# Patient Record
Sex: FEMALE | Race: BLACK OR AFRICAN AMERICAN | NOT HISPANIC OR LATINO | Employment: FULL TIME | URBAN - METROPOLITAN AREA
[De-identification: names, ages, dates, MRNs, and addresses within clinical notes are randomized per-mention and may not be internally consistent; named-entity substitution may affect disease eponyms.]

---

## 2022-06-16 ENCOUNTER — OFFICE VISIT (OUTPATIENT)
Dept: DERMATOLOGY | Facility: CLINIC | Age: 42
End: 2022-06-16
Payer: COMMERCIAL

## 2022-06-16 ENCOUNTER — PATIENT MESSAGE (OUTPATIENT)
Dept: DERMATOLOGY | Facility: CLINIC | Age: 42
End: 2022-06-16

## 2022-06-16 DIAGNOSIS — L65.8 TRACTION ALOPECIA: ICD-10-CM

## 2022-06-16 DIAGNOSIS — L71.9 ROSACEA: Primary | ICD-10-CM

## 2022-06-16 DIAGNOSIS — L85.8 KERATOSIS PILARIS: ICD-10-CM

## 2022-06-16 DIAGNOSIS — L66.8 CENTRAL CENTRIFUGAL SCARRING ALOPECIA: ICD-10-CM

## 2022-06-16 PROCEDURE — 1159F PR MEDICATION LIST DOCUMENTED IN MEDICAL RECORD: ICD-10-PCS | Mod: CPTII,S$GLB,, | Performed by: DERMATOLOGY

## 2022-06-16 PROCEDURE — 1159F MED LIST DOCD IN RCRD: CPT | Mod: CPTII,S$GLB,, | Performed by: DERMATOLOGY

## 2022-06-16 PROCEDURE — 1160F RVW MEDS BY RX/DR IN RCRD: CPT | Mod: CPTII,S$GLB,, | Performed by: DERMATOLOGY

## 2022-06-16 PROCEDURE — 99204 PR OFFICE/OUTPT VISIT, NEW, LEVL IV, 45-59 MIN: ICD-10-PCS | Mod: S$GLB,,, | Performed by: DERMATOLOGY

## 2022-06-16 PROCEDURE — 99204 OFFICE O/P NEW MOD 45 MIN: CPT | Mod: S$GLB,,, | Performed by: DERMATOLOGY

## 2022-06-16 PROCEDURE — 1160F PR REVIEW ALL MEDS BY PRESCRIBER/CLIN PHARMACIST DOCUMENTED: ICD-10-PCS | Mod: CPTII,S$GLB,, | Performed by: DERMATOLOGY

## 2022-06-16 PROCEDURE — 99999 PR PBB SHADOW E&M-EST. PATIENT-LVL III: CPT | Mod: PBBFAC,,, | Performed by: DERMATOLOGY

## 2022-06-16 PROCEDURE — 99999 PR PBB SHADOW E&M-EST. PATIENT-LVL III: ICD-10-PCS | Mod: PBBFAC,,, | Performed by: DERMATOLOGY

## 2022-06-16 RX ORDER — MOMETASONE FUROATE 1 MG/ML
SOLUTION TOPICAL
Qty: 60 ML | Refills: 3 | Status: SHIPPED | OUTPATIENT
Start: 2022-06-16

## 2022-06-16 RX ORDER — AZELAIC ACID 0.15 G/G
AEROSOL, FOAM TOPICAL
Qty: 50 G | Refills: 5 | Status: SHIPPED | OUTPATIENT
Start: 2022-06-16 | End: 2023-09-05 | Stop reason: SDUPTHER

## 2022-06-16 RX ORDER — AMMONIUM LACTATE 12 G/100G
LOTION TOPICAL
Qty: 225 G | Refills: 11 | Status: SHIPPED | OUTPATIENT
Start: 2022-06-16 | End: 2023-09-05 | Stop reason: SDUPTHER

## 2022-06-16 NOTE — PROGRESS NOTES
Subjective:       Patient ID:  Sasha Rothman is a 42 y.o. female who presents for   Chief Complaint   Patient presents with    Hair Loss    Acne     History of Present Illness: The patient presents with chief complaint of acne, hair loss and kp.  Location: scalp(front), face and arms..  Duration: years  Signs/Symptoms: thinning    Prior treatments: none    She also c/o acne and keratosis pilaris.       Review of Systems   Constitutional: Negative for fever and chills.   Gastrointestinal: Negative for nausea and vomiting.   Skin: Positive for activity-related sunscreen use. Negative for daily sunscreen use and recent sunburn.   Hematologic/Lymphatic: Does not bruise/bleed easily.        Objective:    Physical Exam   Constitutional: She appears well-developed and well-nourished. No distress.   Neurological: She is alert and oriented to person, place, and time. She is not disoriented.   Psychiatric: She has a normal mood and affect.   Skin:   Areas Examined (abnormalities noted in diagram):   Scalp / Hair Palpated and Inspected  Head / Face Inspection Performed  Neck Inspection Performed  RUE Inspected  LUE Inspection Performed              Diagram Legend     Open and closed comedones      Inflammatory papules and pustules                         Assessment / Plan:        Rosacea  -     azelaic acid (FINACEA) 15 % Foam; AAA bid for redness/rosacea on face  Dispense: 50 g; Refill: 5  -     Mild of face, will start above med with sunscreen.     Traction alopecia  Central centrifugal scarring alopecia  -     mometasone (ELOCON) 0.1 % solution; AAA of scalp once daily.  Dispense: 60 mL; Refill: 3  -     Will start above med with hairstim metformin.  Consider hairstim minoxidil at f/u. Recommend OTC biotin.     Keratosis pilaris  -     ammonium lactate (LAC-HYDRIN) 12 % lotion; Apply to damp skin after bathing for keratosis pilaris  Dispense: 225 g; Refill: 11  -     Discussed dry skin care, AVS given.  Will start  ammonium lactate 12% lotion daily to damp skin.            Follow up in about 3 months (around 9/16/2022).

## 2022-11-09 ENCOUNTER — OFFICE VISIT (OUTPATIENT)
Dept: DERMATOLOGY | Facility: CLINIC | Age: 42
End: 2022-11-09
Payer: COMMERCIAL

## 2022-11-09 DIAGNOSIS — L66.8 CENTRAL CENTRIFUGAL SCARRING ALOPECIA: ICD-10-CM

## 2022-11-09 DIAGNOSIS — L65.8 TRACTION ALOPECIA: ICD-10-CM

## 2022-11-09 DIAGNOSIS — L71.9 ROSACEA: Primary | ICD-10-CM

## 2022-11-09 DIAGNOSIS — L85.8 KERATOSIS PILARIS: ICD-10-CM

## 2022-11-09 PROCEDURE — 99999 PR PBB SHADOW E&M-EST. PATIENT-LVL III: ICD-10-PCS | Mod: PBBFAC,,, | Performed by: DERMATOLOGY

## 2022-11-09 PROCEDURE — 1159F PR MEDICATION LIST DOCUMENTED IN MEDICAL RECORD: ICD-10-PCS | Mod: CPTII,S$GLB,, | Performed by: DERMATOLOGY

## 2022-11-09 PROCEDURE — 1160F PR REVIEW ALL MEDS BY PRESCRIBER/CLIN PHARMACIST DOCUMENTED: ICD-10-PCS | Mod: CPTII,S$GLB,, | Performed by: DERMATOLOGY

## 2022-11-09 PROCEDURE — 99214 OFFICE O/P EST MOD 30 MIN: CPT | Mod: 25,S$GLB,, | Performed by: DERMATOLOGY

## 2022-11-09 PROCEDURE — 1160F RVW MEDS BY RX/DR IN RCRD: CPT | Mod: CPTII,S$GLB,, | Performed by: DERMATOLOGY

## 2022-11-09 PROCEDURE — 99214 PR OFFICE/OUTPT VISIT, EST, LEVL IV, 30-39 MIN: ICD-10-PCS | Mod: 25,S$GLB,, | Performed by: DERMATOLOGY

## 2022-11-09 PROCEDURE — 1159F MED LIST DOCD IN RCRD: CPT | Mod: CPTII,S$GLB,, | Performed by: DERMATOLOGY

## 2022-11-09 PROCEDURE — 11900 PR INJECTION INTO SKIN LESIONS, UP TO 7: ICD-10-PCS | Mod: S$GLB,,, | Performed by: DERMATOLOGY

## 2022-11-09 PROCEDURE — 99999 PR PBB SHADOW E&M-EST. PATIENT-LVL III: CPT | Mod: PBBFAC,,, | Performed by: DERMATOLOGY

## 2022-11-09 PROCEDURE — 11900 INJECT SKIN LESIONS </W 7: CPT | Mod: S$GLB,,, | Performed by: DERMATOLOGY

## 2022-11-09 NOTE — PROGRESS NOTES
Subjective:       Patient ID:  Sasha Rothman is a 42 y.o. female who presents for   Chief Complaint   Patient presents with    Rosacea     Follow up; improvement ; pt states same week face started to feel better     Hx of traction alopecia and CCCA, rosacea and KP, last seen on 6/16/20.  She is currently using mometasone solution qD, hairstim metformin x 5 months, and nutrafol.  She denies significant improvement.      For rosacea, she is currently using finace afoam with improvement.     For KP, she uses amlactin lotion prn.           Review of Systems   Constitutional:  Negative for fever and chills.   Gastrointestinal:  Negative for nausea and vomiting.   Skin:  Positive for activity-related sunscreen use. Negative for daily sunscreen use and recent sunburn.   Hematologic/Lymphatic: Does not bruise/bleed easily.      Objective:    Physical Exam   Constitutional: She appears well-developed and well-nourished. No distress.   Neurological: She is alert and oriented to person, place, and time. She is not disoriented.   Psychiatric: She has a normal mood and affect.   Skin:   Areas Examined (abnormalities noted in diagram):   Scalp / Hair Palpated and Inspected  Head / Face Inspection Performed  Neck Inspection Performed  RUE Inspected  LUE Inspection Performed  Nails and Digits Inspection Performed                     Assessment / Plan:        Traction alopecia  Central centrifugal scarring alopecia  -     triamcinolone acetonide injection 10 mg  -     emollient combin. no.15, bulk, (PCCA LIPODERM BASE) Crea; Apply to scalp once daily  Dispense: 45 g; Refill: 5  -     triamcinolone acetonide injection 10 mg  -     continue mometasone solution solution, nutrafol, will change from hairstim metformin to Javi's topical metformin.  Will add hairstim minoxidil. The patient acknowledged understanding.   ILK #1 done today.  After risks, benefits and alternatives explained and side effect profile reviewed, including  hypopigmentation, telangiectasia and atrophy, the patient verbally consented to ILK injection. A total of 2 cc of kenalog 10 mg/ml was injected into the areas of hair loss for < 7 sites/lesions.  Pt tolerated well without side effects.  RTC in 4 weeks for repeat injection.    Rosacea  Continue finacea foam and sunscreen.    Keratosis pilaris  Continue amlactin.          No follow-ups on file.

## 2022-11-10 RX ORDER — EMOLLIENT COMBINATION NO. 15
CREAM (GRAM) MISCELLANEOUS
Qty: 45 G | Refills: 5 | Status: SHIPPED | OUTPATIENT
Start: 2022-11-10

## 2022-12-09 ENCOUNTER — OFFICE VISIT (OUTPATIENT)
Dept: DERMATOLOGY | Facility: CLINIC | Age: 42
End: 2022-12-09
Payer: COMMERCIAL

## 2022-12-09 DIAGNOSIS — L65.8 TRACTION ALOPECIA: Primary | ICD-10-CM

## 2022-12-09 DIAGNOSIS — L66.8 CENTRAL CENTRIFUGAL SCARRING ALOPECIA: ICD-10-CM

## 2022-12-09 PROCEDURE — 1159F MED LIST DOCD IN RCRD: CPT | Mod: CPTII,S$GLB,, | Performed by: DERMATOLOGY

## 2022-12-09 PROCEDURE — 1159F PR MEDICATION LIST DOCUMENTED IN MEDICAL RECORD: ICD-10-PCS | Mod: CPTII,S$GLB,, | Performed by: DERMATOLOGY

## 2022-12-09 PROCEDURE — 1160F RVW MEDS BY RX/DR IN RCRD: CPT | Mod: CPTII,S$GLB,, | Performed by: DERMATOLOGY

## 2022-12-09 PROCEDURE — 11900 PR INJECTION INTO SKIN LESIONS, UP TO 7: ICD-10-PCS | Mod: S$GLB,,, | Performed by: DERMATOLOGY

## 2022-12-09 PROCEDURE — 99999 PR PBB SHADOW E&M-EST. PATIENT-LVL III: CPT | Mod: PBBFAC,,, | Performed by: DERMATOLOGY

## 2022-12-09 PROCEDURE — 99999 PR PBB SHADOW E&M-EST. PATIENT-LVL III: ICD-10-PCS | Mod: PBBFAC,,, | Performed by: DERMATOLOGY

## 2022-12-09 PROCEDURE — 99499 UNLISTED E&M SERVICE: CPT | Mod: S$GLB,,, | Performed by: DERMATOLOGY

## 2022-12-09 PROCEDURE — 1160F PR REVIEW ALL MEDS BY PRESCRIBER/CLIN PHARMACIST DOCUMENTED: ICD-10-PCS | Mod: CPTII,S$GLB,, | Performed by: DERMATOLOGY

## 2022-12-09 PROCEDURE — 99499 NO LOS: ICD-10-PCS | Mod: S$GLB,,, | Performed by: DERMATOLOGY

## 2022-12-09 PROCEDURE — 11900 INJECT SKIN LESIONS </W 7: CPT | Mod: S$GLB,,, | Performed by: DERMATOLOGY

## 2022-12-09 NOTE — PROGRESS NOTES
Subjective:       Patient ID:  Sasha Rothman is a 42 y.o. female who presents for   Chief Complaint   Patient presents with    Hair Loss     Injections      Hx of traction alopecia and CCCA, rosacea (on finacea foam) and KP, last seen on 11/19/22.  She is currently using mometasone solution qD, compounded metformin, hairstim minoxidil, and nutrafol.  Uncertain if significant improvement.              Review of Systems   Constitutional:  Negative for fever and chills.   Gastrointestinal:  Negative for nausea and vomiting.   Skin:  Positive for activity-related sunscreen use. Negative for daily sunscreen use and recent sunburn.   Hematologic/Lymphatic: Does not bruise/bleed easily.      Objective:    Physical Exam   Constitutional: She appears well-developed and well-nourished. No distress.   Neurological: She is alert and oriented to person, place, and time. She is not disoriented.   Psychiatric: She has a normal mood and affect.   Skin:   Areas Examined (abnormalities noted in diagram):   Scalp / Hair Palpated and Inspected  Head / Face Inspection Performed  Neck Inspection Performed  RUE Inspected  LUE Inspection Performed  Nails and Digits Inspection Performed                Assessment / Plan:        Traction alopecia  Central centrifugal scarring alopecia  -     triamcinolone acetonide injection 10 mg  -     continue mometasone solution solution, nutrafol, hairstim minoxidil, compound metformin.  ILK #2 done today.  After risks, benefits and alternatives explained and side effect profile reviewed, including hypopigmentation, telangiectasia and atrophy, the patient verbally consented to ILK injection. A total of 2 cc of kenalog 10 mg/ml was injected into the areas of hair loss for < 7 sites/lesions.  Pt tolerated well without side effects.  RTC in 4 weeks for repeat injection.        Follow up in about 4 weeks (around 1/6/2023).

## 2023-01-06 ENCOUNTER — OFFICE VISIT (OUTPATIENT)
Dept: DERMATOLOGY | Facility: CLINIC | Age: 43
End: 2023-01-06
Payer: COMMERCIAL

## 2023-01-06 DIAGNOSIS — L65.8 TRACTION ALOPECIA: Primary | ICD-10-CM

## 2023-01-06 DIAGNOSIS — L66.8 CENTRAL CENTRIFUGAL SCARRING ALOPECIA: ICD-10-CM

## 2023-01-06 PROCEDURE — 1159F MED LIST DOCD IN RCRD: CPT | Mod: CPTII,S$GLB,, | Performed by: DERMATOLOGY

## 2023-01-06 PROCEDURE — 99999 PR PBB SHADOW E&M-EST. PATIENT-LVL III: CPT | Mod: PBBFAC,,, | Performed by: DERMATOLOGY

## 2023-01-06 PROCEDURE — 99499 NO LOS: ICD-10-PCS | Mod: S$GLB,,, | Performed by: DERMATOLOGY

## 2023-01-06 PROCEDURE — 96372 THER/PROPH/DIAG INJ SC/IM: CPT | Mod: S$GLB,,, | Performed by: DERMATOLOGY

## 2023-01-06 PROCEDURE — 1160F PR REVIEW ALL MEDS BY PRESCRIBER/CLIN PHARMACIST DOCUMENTED: ICD-10-PCS | Mod: CPTII,S$GLB,, | Performed by: DERMATOLOGY

## 2023-01-06 PROCEDURE — 96372 PR INJECTION,THERAP/PROPH/DIAG2ST, IM OR SUBCUT: ICD-10-PCS | Mod: S$GLB,,, | Performed by: DERMATOLOGY

## 2023-01-06 PROCEDURE — 99499 UNLISTED E&M SERVICE: CPT | Mod: S$GLB,,, | Performed by: DERMATOLOGY

## 2023-01-06 PROCEDURE — 1159F PR MEDICATION LIST DOCUMENTED IN MEDICAL RECORD: ICD-10-PCS | Mod: CPTII,S$GLB,, | Performed by: DERMATOLOGY

## 2023-01-06 PROCEDURE — 1160F RVW MEDS BY RX/DR IN RCRD: CPT | Mod: CPTII,S$GLB,, | Performed by: DERMATOLOGY

## 2023-01-06 PROCEDURE — 99999 PR PBB SHADOW E&M-EST. PATIENT-LVL III: ICD-10-PCS | Mod: PBBFAC,,, | Performed by: DERMATOLOGY

## 2023-01-06 NOTE — PROGRESS NOTES
Subjective:       Patient ID:  Sasha Rothman is a 42 y.o. female who presents for   Chief Complaint   Patient presents with    Injections     Hx of traction alopecia and CCCA, rosacea (on finacea foam) and KP, last seen on 12/9/22.  She is currently using mometasone solution qD, compounded metformin, hairstim minoxidil, and nutrafol.  + mild improvement at hairline. Denies improvement vertex scalp.            Review of Systems   Constitutional:  Negative for fever and chills.   Gastrointestinal:  Negative for nausea and vomiting.   Skin:  Positive for activity-related sunscreen use. Negative for daily sunscreen use and recent sunburn.   Hematologic/Lymphatic: Does not bruise/bleed easily.      Objective:    Physical Exam   Constitutional: She appears well-developed and well-nourished. No distress.   Neurological: She is alert and oriented to person, place, and time. She is not disoriented.   Psychiatric: She has a normal mood and affect.   Skin:   Areas Examined (abnormalities noted in diagram):   Scalp / Hair Palpated and Inspected  Head / Face Inspection Performed  Neck Inspection Performed  RUE Inspected  LUE Inspection Performed  Nails and Digits Inspection Performed              Assessment / Plan:        Traction alopecia  Central centrifugal scarring alopecia  -     triamcinolone acetonide injection 10 mg  -     Continue mometasone solution solution, nutrafol, hairstim minoxidil, compound metformin.  ILK #3 done today.  After risks, benefits and alternatives explained and side effect profile reviewed, including hypopigmentation, telangiectasia and atrophy, the patient verbally consented to ILK injection. A total of 2 cc of kenalog 10 mg/ml was injected into the areas of hair loss for < 7 sites/lesions.  Pt tolerated well without side effects.  RTC in 4 weeks for repeat injection.           Follow up in about 8 weeks (around 3/3/2023).

## 2023-03-08 ENCOUNTER — OFFICE VISIT (OUTPATIENT)
Dept: DERMATOLOGY | Facility: CLINIC | Age: 43
End: 2023-03-08
Payer: COMMERCIAL

## 2023-03-08 DIAGNOSIS — L65.8 TRACTION ALOPECIA: Primary | ICD-10-CM

## 2023-03-08 DIAGNOSIS — L66.8 CENTRAL CENTRIFUGAL SCARRING ALOPECIA: ICD-10-CM

## 2023-03-08 PROCEDURE — 1160F PR REVIEW ALL MEDS BY PRESCRIBER/CLIN PHARMACIST DOCUMENTED: ICD-10-PCS | Mod: CPTII,S$GLB,, | Performed by: DERMATOLOGY

## 2023-03-08 PROCEDURE — 99999 PR PBB SHADOW E&M-EST. PATIENT-LVL III: ICD-10-PCS | Mod: PBBFAC,,, | Performed by: DERMATOLOGY

## 2023-03-08 PROCEDURE — 1159F PR MEDICATION LIST DOCUMENTED IN MEDICAL RECORD: ICD-10-PCS | Mod: CPTII,S$GLB,, | Performed by: DERMATOLOGY

## 2023-03-08 PROCEDURE — 1160F RVW MEDS BY RX/DR IN RCRD: CPT | Mod: CPTII,S$GLB,, | Performed by: DERMATOLOGY

## 2023-03-08 PROCEDURE — 99212 OFFICE O/P EST SF 10 MIN: CPT | Mod: S$GLB,,, | Performed by: DERMATOLOGY

## 2023-03-08 PROCEDURE — 99999 PR PBB SHADOW E&M-EST. PATIENT-LVL III: CPT | Mod: PBBFAC,,, | Performed by: DERMATOLOGY

## 2023-03-08 PROCEDURE — 99212 PR OFFICE/OUTPT VISIT, EST, LEVL II, 10-19 MIN: ICD-10-PCS | Mod: S$GLB,,, | Performed by: DERMATOLOGY

## 2023-03-08 PROCEDURE — 1159F MED LIST DOCD IN RCRD: CPT | Mod: CPTII,S$GLB,, | Performed by: DERMATOLOGY

## 2023-03-08 NOTE — PROGRESS NOTES
Subjective:       Patient ID:  Sasha Rothman is a 43 y.o. female who presents for   Chief Complaint   Patient presents with    Injections     Follow up for series of hair loss injections     Hx of traction alopecia and CCCA, rosacea (on finacea foam) and KP, last seen on 1/6/23.  She is currently using compounded metformin, hairstim minoxidil, and nutrafol.  + mild improvement at hairline. Denies improvement vertex scalp.    Prior tx: mometasone solution, compounded metformin, hairstim minoxidil, nutrafol, ILK            Review of Systems   Constitutional:  Negative for fever and chills.   Gastrointestinal:  Negative for nausea and vomiting.   Skin:  Positive for activity-related sunscreen use. Negative for daily sunscreen use and recent sunburn.   Hematologic/Lymphatic: Does not bruise/bleed easily.      Objective:    Physical Exam   Constitutional: She appears well-developed and well-nourished. No distress.   Neurological: She is alert and oriented to person, place, and time. She is not disoriented.   Psychiatric: She has a normal mood and affect.   Skin:   Areas Examined (abnormalities noted in diagram):   Scalp / Hair Palpated and Inspected  Head / Face Inspection Performed  Neck Inspection Performed  RUE Inspected  LUE Inspection Performed  Nails and Digits Inspection Performed              Assessment / Plan:        Traction alopecia  Central centrifugal scarring alopecia  -     triamcinolone acetonide injection 10 mg  -     + improvement in frontal hairline, however recalcitrant areas of the vertex and temporal scalp.  Pt defers ILK today.  Discussed pt should continue topical therapy (hairstim minoxidil and compound metformin) and consider consultation with Dr. Kanwal Dumont for consideration of PRP or other therapies. Also recommend continue nutrafol.            Follow up if symptoms worsen or fail to improve.

## 2023-03-17 ENCOUNTER — TELEPHONE (OUTPATIENT)
Dept: DERMATOLOGY | Facility: CLINIC | Age: 43
End: 2023-03-17
Payer: COMMERCIAL

## 2023-03-17 NOTE — TELEPHONE ENCOUNTER
Returned call to patient.  Informed pt Dr. Garcia is not in office today, msg will be sent over to Dr. Garcia     ----- Message from [employer] [place] sent at 3/17/2023 12:21 PM CDT -----  Contact: Sasha  Patient is calling to speak with nurse regarding concerns. Reports since last appt even with using topical cream, skin on scalp is itching an peeling off in chunks. Please give patient a call back at [phone]

## 2023-03-24 ENCOUNTER — TELEPHONE (OUTPATIENT)
Dept: DERMATOLOGY | Facility: CLINIC | Age: 43
End: 2023-03-24
Payer: COMMERCIAL

## 2023-03-24 NOTE — TELEPHONE ENCOUNTER
Retuned call to patient to let pt know Per Dr. Garcia, I Recommend Sasha reach out to [facility] to see if they switched her formulation to one with tretinoin.  otherwise, she can stop everything on the scalp and restart mometasone solution for a few days to see if improves. No answer. LVM.        ----- Message from Audrey Garcia MD sent at 3/23/2023  5:59 PM CDT -----  Regarding: RE: Please Advise  Contact: Sasha  Recommend Sasha reach out to [facility] to see if they switched her formulation to one with tretinoin.  Othwrise, she can stop everything on the scalp and restart mometasone solution for a few days to see if improves.  ----- Message -----  From: Migdalia Cherry MA  Sent: 3/17/2023   1:59 PM CDT  To: Audrey Garcia MD  Subject: Please Advise                                    Spoke with patient, patient stated scalp has been peeling with big flakes and itching since using the topicals that were prescribed.  Patient is asking should she continue the medication or should she come in for a visit?  ----- Message -----  From: Natali Edwards  Sent: 3/17/2023  12:22 PM CDT  To: Jose Ventura Staff    Patient is calling to speak with nurse regarding concerns. Reports since last appt even with using topical cream, skin on scalp is itching an peeling off in chunks. Please give patient a call back at [phone]

## 2023-03-28 ENCOUNTER — PATIENT MESSAGE (OUTPATIENT)
Dept: RESEARCH | Facility: HOSPITAL | Age: 43
End: 2023-03-28
Payer: COMMERCIAL

## 2023-04-04 ENCOUNTER — TELEPHONE (OUTPATIENT)
Dept: DERMATOLOGY | Facility: CLINIC | Age: 43
End: 2023-04-04
Payer: COMMERCIAL

## 2023-04-04 NOTE — TELEPHONE ENCOUNTER
Returned call. Relayed message given to Migdalia by Dr. Garcia. Pt verbalized understanding and will keep us updated on her scalp.   ----- Message from Rosario Cleveland sent at 4/4/2023  4:11 PM CDT -----  Contact: Sasha  Type:  Patient Returning Call    Who Called:Sasha  Who Left Message for Patient: Migdalia   Does the patient know what this is regarding?:  not sure   Would the patient rather a call back or a response via [facility] [employer] [facility]? Call back  Best Call Back Number:[phone]  Additional Information:   Thanks   Am

## 2023-09-05 DIAGNOSIS — L71.9 ROSACEA: ICD-10-CM

## 2023-09-05 DIAGNOSIS — L85.8 KERATOSIS PILARIS: ICD-10-CM

## 2023-09-05 RX ORDER — AZELAIC ACID 0.15 G/G
AEROSOL, FOAM TOPICAL
Qty: 50 G | Refills: 5 | Status: SHIPPED | OUTPATIENT
Start: 2023-09-05

## 2023-09-05 RX ORDER — AMMONIUM LACTATE 12 G/100G
LOTION TOPICAL
Qty: 225 G | Refills: 11 | Status: SHIPPED | OUTPATIENT
Start: 2023-09-05

## 2024-05-13 ENCOUNTER — OFFICE VISIT (OUTPATIENT)
Dept: DERMATOLOGY | Facility: CLINIC | Age: 44
End: 2024-05-13
Payer: COMMERCIAL

## 2024-05-13 DIAGNOSIS — B00.1 HERPES LABIALIS: ICD-10-CM

## 2024-05-13 DIAGNOSIS — L71.9 ROSACEA: ICD-10-CM

## 2024-05-13 DIAGNOSIS — L30.9 DERMATITIS OF LIP: Primary | ICD-10-CM

## 2024-05-13 PROCEDURE — 99214 OFFICE O/P EST MOD 30 MIN: CPT | Mod: S$GLB,,, | Performed by: DERMATOLOGY

## 2024-05-13 PROCEDURE — 3044F HG A1C LEVEL LT 7.0%: CPT | Mod: CPTII,S$GLB,, | Performed by: DERMATOLOGY

## 2024-05-13 PROCEDURE — 1159F MED LIST DOCD IN RCRD: CPT | Mod: CPTII,S$GLB,, | Performed by: DERMATOLOGY

## 2024-05-13 PROCEDURE — 1160F RVW MEDS BY RX/DR IN RCRD: CPT | Mod: CPTII,S$GLB,, | Performed by: DERMATOLOGY

## 2024-05-13 PROCEDURE — 99999 PR PBB SHADOW E&M-EST. PATIENT-LVL III: CPT | Mod: PBBFAC,,, | Performed by: DERMATOLOGY

## 2024-05-13 RX ORDER — TRIAMCINOLONE ACETONIDE 0.25 MG/G
OINTMENT TOPICAL
Qty: 30 G | Refills: 1 | Status: SHIPPED | OUTPATIENT
Start: 2024-05-13

## 2024-05-13 RX ORDER — METRONIDAZOLE 7.5 MG/G
CREAM TOPICAL 2 TIMES DAILY
Qty: 45 G | Refills: 5 | Status: SHIPPED | OUTPATIENT
Start: 2024-05-13 | End: 2025-05-13

## 2024-05-13 NOTE — PROGRESS NOTES
Subjective:      Patient ID:  Sasha Rothman is a 44 y.o. female who presents for   Chief Complaint   Patient presents with    Dry Skin     Located on the face and the lips/ pt states it feels like a barrier on her lips. Pt states a previous cold sore last week. X 1 week s/s dryness, Tx lip oils/ non effective ,OTC creams  / non effective      Hx of traction alopecia and CCCA, rosacea (on finacea foam) and KP, last seen on 3/8/23. Here today for new issue:    History of Present Illness: The patient presents with chief complaint of lip dryness. Possible cold sore.   Location: lips  Duration: several weeks  Signs/Symptoms: dryness, changes in texture    Prior treatments: aquaphor lip protection and vaseline advanced healing    Using new facial pads with glycolic or sal acid.               Review of Systems   Constitutional:  Negative for fever and chills.   Gastrointestinal:  Negative for nausea and vomiting.   Skin:  Positive for activity-related sunscreen use and dry lips. Negative for daily sunscreen use and recent sunburn.   Hematologic/Lymphatic: Does not bruise/bleed easily.       Objective:   Physical Exam   Constitutional: She appears well-developed and well-nourished. No distress.   Neurological: She is alert and oriented to person, place, and time. She is not disoriented.   Psychiatric: She has a normal mood and affect.   Skin:   Areas Examined (abnormalities noted in diagram):   Head / Face Inspection Performed  Neck Inspection Performed  RUE Inspected  LUE Inspection Performed  Nails and Digits Inspection Performed             Assessment / Plan:        Dermatitis of lip  -     triamcinolone acetonide 0.025% (KENALOG) 0.025 % Oint; AAA bid prn. Use for 1 week then taper. Mild steroid. For irritation of the lips.  Dispense: 30 g; Refill: 1  -     recommend d/c products with glycolic acid/sal acid/benzoyl peroxide. Will start above med. Will give samples of gentle cleanser/moisturizer. The patient  acknowledged understanding.     Rosacea  -     metronidazole 0.75% (METROCREAM) 0.75 % Crea; Apply topically 2 (two) times daily.  Dispense: 45 g; Refill: 5  -     with erythema, will d/c finacea foam and start trial of above med instead. The patient acknowledged understanding.     Herpes labialis  Currently resolved, discussed pt can message if recurs and will prescribe valtrex. The patient acknowledged understanding.              Follow up in about 3 months (around 8/13/2024).

## 2024-05-24 ENCOUNTER — PATIENT MESSAGE (OUTPATIENT)
Dept: DERMATOLOGY | Facility: CLINIC | Age: 44
End: 2024-05-24
Payer: COMMERCIAL

## 2024-09-30 ENCOUNTER — TELEPHONE (OUTPATIENT)
Dept: DERMATOLOGY | Facility: CLINIC | Age: 44
End: 2024-09-30
Payer: COMMERCIAL

## 2024-09-30 NOTE — TELEPHONE ENCOUNTER
Called pt regarding scheduling . Pt successfully scheduled for 10/3 with Anna Coates PA-C. Pt verbalized understanding.     ----- Message from Jeanette Munoz sent at 9/30/2024 11:17 AM CDT -----  Regarding: appt access  PATIENT CALL    Pt called to schedule EP appt. CC: rash above the breasts, spreading up to her shoulders. No appts in EPIC, pt open to seeing an SCAR if that's the only thing available. Please call back at [phone] to assist further

## 2024-10-03 ENCOUNTER — OFFICE VISIT (OUTPATIENT)
Dept: DERMATOLOGY | Facility: CLINIC | Age: 44
End: 2024-10-03
Payer: COMMERCIAL

## 2024-10-03 DIAGNOSIS — L30.9 DERMATITIS: Primary | ICD-10-CM

## 2024-10-03 DIAGNOSIS — L30.9 DERMATITIS OF LIP: ICD-10-CM

## 2024-10-03 PROCEDURE — 99999 PR PBB SHADOW E&M-EST. PATIENT-LVL III: CPT | Mod: PBBFAC,,, | Performed by: PHYSICIAN ASSISTANT

## 2024-10-03 PROCEDURE — 1160F RVW MEDS BY RX/DR IN RCRD: CPT | Mod: CPTII,S$GLB,, | Performed by: PHYSICIAN ASSISTANT

## 2024-10-03 PROCEDURE — 1159F MED LIST DOCD IN RCRD: CPT | Mod: CPTII,S$GLB,, | Performed by: PHYSICIAN ASSISTANT

## 2024-10-03 PROCEDURE — 3044F HG A1C LEVEL LT 7.0%: CPT | Mod: CPTII,S$GLB,, | Performed by: PHYSICIAN ASSISTANT

## 2024-10-03 PROCEDURE — 99214 OFFICE O/P EST MOD 30 MIN: CPT | Mod: S$GLB,,, | Performed by: PHYSICIAN ASSISTANT

## 2024-10-03 RX ORDER — TRIAMCINOLONE ACETONIDE 0.25 MG/G
CREAM TOPICAL 2 TIMES DAILY
Qty: 80 G | Refills: 0 | Status: SHIPPED | OUTPATIENT
Start: 2024-10-03

## 2024-10-03 NOTE — PROGRESS NOTES
Subjective:      Patient ID:  Sasha Rothman is a 44 y.o. female who presents for   Chief Complaint   Patient presents with    Rash     Located on the neck and bilateral shoulder X 1 week s/s fine bumps, spreading, minimal itching/sensitive. Tx benadryl,, metrocream/ non effective       Hx of traction alopecia and CCCA, rosacea (on finacea foam) and KP, last seen on 3/8/23. Here today for new issue: rash of chest, started about a week ago. Feels like fine bumps which are more prominent at times. +Occasional itching. No stinging, burning, or blistering.  Denies aggravating factors.  Believes it is slowly starting to spread out to shoulders and neck.  She is unsure if this is related to her rosacea or if something different.   Only uses Cetaphil Gentle cleanser and Cetaphil moisturizer.     Denies new meds, soaps, detergents.                Review of Systems   Constitutional:  Negative for fever and chills.   Gastrointestinal:  Negative for nausea and vomiting.   Skin:  Positive for itching, rash and activity-related sunscreen use. Negative for dry skin, sun sensitivity, daily sunscreen use, recent sunburn and dry lips.   Hematologic/Lymphatic: Does not bruise/bleed easily.       Objective:   Physical Exam   Constitutional: She appears well-developed and well-nourished. No distress.   Neurological: She is alert and oriented to person, place, and time. She is not disoriented.   Psychiatric: She has a normal mood and affect.   Skin:   Areas Examined (abnormalities noted in diagram):   Head / Face Inspection Performed  Neck Inspection Performed  Chest / Axilla Inspection Performed  Abdomen Inspection Performed  Back Inspection Performed  RUE Inspected  LUE Inspection Performed  RLE Inspected  LLE Inspection Performed            Diagram Legend     Erythematous scaling macule/papule c/w actinic keratosis       Vascular papule c/w angioma      Pigmented verrucoid papule/plaque c/w seborrheic keratosis      Yellow  umbilicated papule c/w sebaceous hyperplasia      Irregularly shaped tan macule c/w lentigo     1-2 mm smooth white papules consistent with Milia      Movable subcutaneous cyst with punctum c/w epidermal inclusion cyst      Subcutaneous movable cyst c/w pilar cyst      Firm pink to brown papule c/w dermatofibroma      Pedunculated fleshy papule(s) c/w skin tag(s)      Evenly pigmented macule c/w junctional nevus     Mildly variegated pigmented, slightly irregular-bordered macule c/w mildly atypical nevus      Flesh colored to evenly pigmented papule c/w intradermal nevus       Pink pearly papule/plaque c/w basal cell carcinoma      Erythematous hyperkeratotic cursted plaque c/w SCC      Surgical scar with no sign of skin cancer recurrence      Open and closed comedones      Inflammatory papules and pustules      Verrucoid papule consistent consistent with wart     Erythematous eczematous patches and plaques     Dystrophic onycholytic nail with subungual debris c/w onychomycosis     Umbilicated papule    Erythematous-base heme-crusted tan verrucoid plaque consistent with inflamed seborrheic keratosis     Erythematous Silvery Scaling Plaque c/w Psoriasis     See annotation      Assessment / Plan:        Dermatitis  -     triamcinolone acetonide 0.025% (KENALOG) 0.025 % cream; Apply topically 2 (two) times daily. PRN rash of chest.  Dispense: 80 g; Refill: 0  Ddx: KP vs. Other eczematous vs. ICD vs. Much less likely rosacea vs. Photosensitivity  Agree to trial of above, liberal emollients, strict sensitive skin care. D/o olay wash. Would reconsider biopsy if not improved.           Follow up in about 2 months (around 12/3/2024).

## 2024-10-03 NOTE — PATIENT INSTRUCTIONS
New Skin Care Regimen  Soap: Dove sensitive skin bar or liquid  Moisturizer: ceraVe or cetaphil cream or La Roche Posay Triple Repair Cream  Detergent: Tide Free, All Free, or Cheer Free   Fabric softener: do not use  Colognes/Perfumes/Fragrances: do not use  Bathing: shower or bathe with lukewarm water < 10 minutes

## 2024-10-25 DIAGNOSIS — L85.8 KERATOSIS PILARIS: ICD-10-CM

## 2024-10-29 RX ORDER — AMMONIUM LACTATE 12 G/100G
LOTION TOPICAL
Qty: 225 G | Refills: 11 | Status: SHIPPED | OUTPATIENT
Start: 2024-10-29

## 2024-11-14 ENCOUNTER — PATIENT MESSAGE (OUTPATIENT)
Dept: DERMATOLOGY | Facility: CLINIC | Age: 44
End: 2024-11-14
Payer: COMMERCIAL

## 2024-11-21 RX ORDER — TRIAMCINOLONE ACETONIDE 1 MG/G
CREAM TOPICAL 2 TIMES DAILY
Qty: 80 G | Refills: 0 | Status: SHIPPED | OUTPATIENT
Start: 2024-11-21

## 2024-12-06 ENCOUNTER — TELEPHONE (OUTPATIENT)
Dept: DERMATOLOGY | Facility: CLINIC | Age: 44
End: 2024-12-06
Payer: COMMERCIAL

## 2024-12-09 ENCOUNTER — TELEPHONE (OUTPATIENT)
Dept: DERMATOLOGY | Facility: CLINIC | Age: 44
End: 2024-12-09
Payer: COMMERCIAL

## 2024-12-09 NOTE — TELEPHONE ENCOUNTER
Returned call. Pt appt on 12/24 canceled and r/s to next Monday for a virtual at 1115  ----- Message from Emilee sent at 12/9/2024 10:53 AM CST -----  Contact: [phone]  Type:  Patient Returning Call    Who Called:Sasha   Who Left Message for Patient:nurse   Does the patient know what this is regarding?:yes   Would the patient rather a call back or a response via [facility]? Call Back   Best Call Back Number:[phone]   Additional Information: n/a      Thanks KB

## 2024-12-16 ENCOUNTER — TELEPHONE (OUTPATIENT)
Dept: DERMATOLOGY | Facility: CLINIC | Age: 44
End: 2024-12-16
Payer: COMMERCIAL

## 2024-12-18 ENCOUNTER — TELEPHONE (OUTPATIENT)
Dept: DERMATOLOGY | Facility: CLINIC | Age: 44
End: 2024-12-18

## 2024-12-18 ENCOUNTER — OFFICE VISIT (OUTPATIENT)
Dept: DERMATOLOGY | Facility: CLINIC | Age: 44
End: 2024-12-18
Payer: COMMERCIAL

## 2024-12-18 DIAGNOSIS — L30.9 DERMATITIS: Primary | ICD-10-CM

## 2024-12-18 PROCEDURE — 99214 OFFICE O/P EST MOD 30 MIN: CPT | Mod: 95,,, | Performed by: STUDENT IN AN ORGANIZED HEALTH CARE EDUCATION/TRAINING PROGRAM

## 2024-12-18 PROCEDURE — 1159F MED LIST DOCD IN RCRD: CPT | Mod: CPTII,95,, | Performed by: STUDENT IN AN ORGANIZED HEALTH CARE EDUCATION/TRAINING PROGRAM

## 2024-12-18 PROCEDURE — 3044F HG A1C LEVEL LT 7.0%: CPT | Mod: CPTII,95,, | Performed by: STUDENT IN AN ORGANIZED HEALTH CARE EDUCATION/TRAINING PROGRAM

## 2024-12-18 PROCEDURE — 1160F RVW MEDS BY RX/DR IN RCRD: CPT | Mod: CPTII,95,, | Performed by: STUDENT IN AN ORGANIZED HEALTH CARE EDUCATION/TRAINING PROGRAM

## 2024-12-18 PROCEDURE — G2211 COMPLEX E/M VISIT ADD ON: HCPCS | Mod: 95,,, | Performed by: STUDENT IN AN ORGANIZED HEALTH CARE EDUCATION/TRAINING PROGRAM

## 2024-12-18 RX ORDER — MOMETASONE FUROATE 1 MG/G
OINTMENT TOPICAL DAILY
Qty: 45 G | Refills: 2 | Status: SHIPPED | OUTPATIENT
Start: 2024-12-18

## 2024-12-18 NOTE — TELEPHONE ENCOUNTER
Outreach made to patient to schedule follow up appointment. Spoke to patient appointment scheduled for  2/13/2025 at 1 pm.      ----- Message from Patrice Jones MD sent at 12/18/2024  4:25 PM CST -----  Can we please schedule patient for follow-up appointment in office for evaluation for her rash? Thanks.

## 2024-12-18 NOTE — PROGRESS NOTES
The patient location is: [place]  The chief complaint leading to consultation is: follow-up eczema/rash    Visit type: audiovisual    Face to Face time with patient: 10mins  10 minutes of total time spent on the encounter, which includes face to face time and non-face to face time preparing to see the patient (eg, review of tests), Obtaining and/or reviewing separately obtained history, Documenting clinical information in the electronic or other health record, Independently interpreting results (not separately reported) and communicating results to the patient/family/caregiver, or Care coordination (not separately reported).         Each patient to whom he or she provides medical services by telemedicine is:  (1) informed of the relationship between the physician and patient and the respective role of any other health care provider with respect to management of the patient; and (2) notified that he or she may decline to receive medical services by telemedicine and may withdraw from such care at any time.    Notes: Presents for follow-up of a rash on the arms and upper chest area, recently seen by STEPHANIE Coates for a bumpy rash on the skin that is very itchy. Was started initially on TAC 0.025% cream, then increased to TAC 0.1%, which did help, but still with a lot of itching and bumpy skin.     ROS: Otherwise negative    PE: const/neuro - AAOx3, NAD          Skin -               A/P: 1) Dermatitis - suspect follicular eczema, as the TAC helped somewhat. Will increase the strength of the topical steroid to mometasone. If no improvement, consider in office visit for biopsy.

## 2025-02-13 ENCOUNTER — OFFICE VISIT (OUTPATIENT)
Dept: DERMATOLOGY | Facility: CLINIC | Age: 45
End: 2025-02-13
Payer: COMMERCIAL

## 2025-02-13 DIAGNOSIS — L73.9 FOLLICULITIS: ICD-10-CM

## 2025-02-13 DIAGNOSIS — L30.9 DERMATITIS: Primary | ICD-10-CM

## 2025-02-13 PROCEDURE — 99999 PR PBB SHADOW E&M-EST. PATIENT-LVL III: CPT | Mod: PBBFAC,,, | Performed by: STUDENT IN AN ORGANIZED HEALTH CARE EDUCATION/TRAINING PROGRAM

## 2025-02-13 PROCEDURE — 99213 OFFICE O/P EST LOW 20 MIN: CPT | Mod: S$GLB,,, | Performed by: STUDENT IN AN ORGANIZED HEALTH CARE EDUCATION/TRAINING PROGRAM

## 2025-02-13 PROCEDURE — G2211 COMPLEX E/M VISIT ADD ON: HCPCS | Mod: S$GLB,,, | Performed by: STUDENT IN AN ORGANIZED HEALTH CARE EDUCATION/TRAINING PROGRAM

## 2025-02-13 PROCEDURE — 1160F RVW MEDS BY RX/DR IN RCRD: CPT | Mod: CPTII,S$GLB,, | Performed by: STUDENT IN AN ORGANIZED HEALTH CARE EDUCATION/TRAINING PROGRAM

## 2025-02-13 PROCEDURE — 1159F MED LIST DOCD IN RCRD: CPT | Mod: CPTII,S$GLB,, | Performed by: STUDENT IN AN ORGANIZED HEALTH CARE EDUCATION/TRAINING PROGRAM

## 2025-02-13 RX ORDER — CLINDAMYCIN PHOSPHATE 10 MG/ML
SOLUTION TOPICAL DAILY
Qty: 60 EACH | Refills: 2 | Status: SHIPPED | OUTPATIENT
Start: 2025-02-13 | End: 2026-02-13

## 2025-02-13 NOTE — PROGRESS NOTES
Subjective:       Patient ID:  Sasha Rothman is a 45 y.o. female who presents for   Chief Complaint   Patient presents with    Dermatitis     Follow up     History of Present Illness: The patient presents for follow up of a rash on the arms, last seen on 12/18/24 via virtual visit for a rash on the arms. She was started on mometasone and this has significantly helped with symptoms. No further itching or irritation.  She also has a history of rosacea, currently has some red bumps and pus bumps on the chest that have been bothersome; no treatments.         Review of Systems   Constitutional:  Negative for fever and chills.   Skin:  Positive for rash. Negative for itching and dry skin.        Objective:    Physical Exam   Constitutional: She appears well-developed and well-nourished. No distress.   Neurological: She is alert and oriented to person, place, and time. She is not disoriented.   Psychiatric: She has a normal mood and affect.   Skin:   Areas Examined (abnormalities noted in diagram):   Head / Face Inspection Performed  Neck Inspection Performed  Chest / Axilla Inspection Performed  RUE Inspected  LUE Inspection Performed              Diagram Legend     Erythematous scaling macule/papule c/w actinic keratosis       Vascular papule c/w angioma      Pigmented verrucoid papule/plaque c/w seborrheic keratosis      Yellow umbilicated papule c/w sebaceous hyperplasia      Irregularly shaped tan macule c/w lentigo     1-2 mm smooth white papules consistent with Milia      Movable subcutaneous cyst with punctum c/w epidermal inclusion cyst      Subcutaneous movable cyst c/w pilar cyst      Firm pink to brown papule c/w dermatofibroma      Pedunculated fleshy papule(s) c/w skin tag(s)      Evenly pigmented macule c/w junctional nevus     Mildly variegated pigmented, slightly irregular-bordered macule c/w mildly atypical nevus      Flesh colored to evenly pigmented papule c/w intradermal nevus       Pink pearly  papule/plaque c/w basal cell carcinoma      Erythematous hyperkeratotic cursted plaque c/w SCC      Surgical scar with no sign of skin cancer recurrence      Open and closed comedones      Inflammatory papules and pustules      Verrucoid papule consistent consistent with wart     Erythematous eczematous patches and plaques     Dystrophic onycholytic nail with subungual debris c/w onychomycosis     Umbilicated papule    Erythematous-base heme-crusted tan verrucoid plaque consistent with inflamed seborrheic keratosis     Erythematous Silvery Scaling Plaque c/w Psoriasis     See annotation      Assessment / Plan:        Dermatitis - on the arms have resolved with mometasone. Continue to use as needed. Counseled patient on gentle skin care regimen, including need for sensitive soaps/detergents, as well as need for frequent use of sensitize moisturizers.       Folliculitis/rosacea - on the chest.   -     clindamycin (CLEOCIN T) 1 % Swab; Apply topically once daily. Apply for rosacea  Dispense: 60 each; Refill: 2             Follow up in 6 months (on 8/13/2025), or if symptoms worsen or fail to improve.